# Patient Record
Sex: MALE | Race: BLACK OR AFRICAN AMERICAN | NOT HISPANIC OR LATINO | Employment: UNEMPLOYED | URBAN - NONMETROPOLITAN AREA
[De-identification: names, ages, dates, MRNs, and addresses within clinical notes are randomized per-mention and may not be internally consistent; named-entity substitution may affect disease eponyms.]

---
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
## 2022-02-17 ENCOUNTER — OFFICE VISIT (OUTPATIENT)
Dept: FAMILY MEDICINE CLINIC | Facility: CLINIC | Age: 22
End: 2022-02-17

## 2022-02-17 ENCOUNTER — LAB (OUTPATIENT)
Dept: LAB | Facility: HOSPITAL | Age: 22
End: 2022-02-17

## 2022-02-17 VITALS
OXYGEN SATURATION: 99 % | HEART RATE: 75 BPM | TEMPERATURE: 98 F | WEIGHT: 141.8 LBS | HEIGHT: 72 IN | DIASTOLIC BLOOD PRESSURE: 76 MMHG | BODY MASS INDEX: 19.21 KG/M2 | SYSTOLIC BLOOD PRESSURE: 122 MMHG

## 2022-02-17 DIAGNOSIS — Z11.3 ROUTINE SCREENING FOR STI (SEXUALLY TRANSMITTED INFECTION): ICD-10-CM

## 2022-02-17 DIAGNOSIS — Z11.3 ROUTINE SCREENING FOR STI (SEXUALLY TRANSMITTED INFECTION): Primary | ICD-10-CM

## 2022-02-17 LAB
BACTERIA UR QL AUTO: ABNORMAL /HPF
BILIRUB UR QL STRIP: NEGATIVE
CLARITY UR: CLEAR
COLOR UR: YELLOW
GLUCOSE UR STRIP-MCNC: NEGATIVE MG/DL
HGB UR QL STRIP.AUTO: NEGATIVE
HYALINE CASTS UR QL AUTO: ABNORMAL /LPF
KETONES UR QL STRIP: NEGATIVE
LEUKOCYTE ESTERASE UR QL STRIP.AUTO: ABNORMAL
NITRITE UR QL STRIP: NEGATIVE
PH UR STRIP.AUTO: 6 [PH] (ref 5–8)
PROT UR QL STRIP: NEGATIVE
RBC # UR STRIP: ABNORMAL /HPF
REF LAB TEST METHOD: ABNORMAL
SP GR UR STRIP: 1.02 (ref 1–1.03)
SQUAMOUS #/AREA URNS HPF: ABNORMAL /HPF
UROBILINOGEN UR QL STRIP: ABNORMAL
WBC # UR STRIP: ABNORMAL /HPF

## 2022-02-17 PROCEDURE — 87661 TRICHOMONAS VAGINALIS AMPLIF: CPT

## 2022-02-17 PROCEDURE — 87491 CHLMYD TRACH DNA AMP PROBE: CPT

## 2022-02-17 PROCEDURE — 81001 URINALYSIS AUTO W/SCOPE: CPT

## 2022-02-17 PROCEDURE — 99213 OFFICE O/P EST LOW 20 MIN: CPT | Performed by: STUDENT IN AN ORGANIZED HEALTH CARE EDUCATION/TRAINING PROGRAM

## 2022-02-17 PROCEDURE — 87591 N.GONORRHOEAE DNA AMP PROB: CPT

## 2022-02-17 RX ORDER — DOXYCYCLINE HYCLATE 100 MG/1
100 CAPSULE ORAL 2 TIMES DAILY
Qty: 14 CAPSULE | Refills: 0 | Status: SHIPPED | OUTPATIENT
Start: 2022-02-17 | End: 2022-08-31

## 2022-02-17 RX ORDER — CEFTRIAXONE 500 MG/1
500 INJECTION, POWDER, FOR SOLUTION INTRAMUSCULAR; INTRAVENOUS ONCE
Qty: 1 EACH | Refills: 0 | Status: SHIPPED | OUTPATIENT
Start: 2022-02-17 | End: 2022-02-18 | Stop reason: SDUPTHER

## 2022-02-17 NOTE — PROGRESS NOTES
Family Medicine Residency  Yesenia Powell MD    Subjective:     Moses Kern is a 21 y.o. male who presents for evalution of STI.     About two weeks ago he started having dysuria which has since resolved. A week ago he started having foul smelling green penile discharge. No fever, chills, rash, open sores on penis, arthralgias or myalgias. He is sexually active and has unprotected intercourse with both male and females. States last intercourse was two weeks ago. Denies tobacco/alcohol use. Is not interested in getting a full STI panel and only would like gonorrhea and chlamydia testing.     The following portions of the patient's history were reviewed and updated as appropriate: past family history, past medical history, past social history, past surgical history and problem list.    Past Medical Hx:  Past Medical History:   Diagnosis Date   • Allergic rhinitis    • Asthma    • Astigmatism    • Cough    • Epistaxis    • Exacerbation of asthma    • Myopia    • Otitis media    • Upper respiratory infection        Past Surgical Hx:  No past surgical history on file.    Current Meds:    Current Outpatient Medications:   •  cefTRIAXone (ROCEPHIN) 500 MG injection, Inject 500 mg into the appropriate muscle as directed by prescriber 1 (One) Time for 1 dose. Patient can bring injection to clinic and have nurse inject it., Disp: 1 each, Rfl: 0  •  doxycycline (VIBRAMYCIN) 100 MG capsule, Take 1 capsule by mouth 2 (Two) Times a Day., Disp: 14 capsule, Rfl: 0    Allergies:  No Known Allergies    Family Hx:  No family history on file.     Social History:  Social History     Socioeconomic History   • Marital status: Single   Tobacco Use   • Smoking status: Never Smoker   • Smokeless tobacco: Never Used   • Tobacco comment: Child   Substance and Sexual Activity   • Alcohol use: Not Currently   • Drug use: Not Currently   • Sexual activity: Defer       Review of Systems  Review of Systems   Constitutional: Negative.  Negative  "for fever.   HENT: Negative.    Eyes: Negative.    Respiratory: Negative.    Cardiovascular: Negative.    Gastrointestinal: Negative.    Endocrine: Negative.    Genitourinary: Positive for dysuria and penile discharge. Negative for difficulty urinating, penile pain, penile swelling and scrotal swelling.   Musculoskeletal: Negative.    Skin: Negative.    Allergic/Immunologic: Negative.    Neurological: Negative.  Negative for dizziness and light-headedness.   Hematological: Negative.    Psychiatric/Behavioral: Negative.  Negative for agitation, confusion and suicidal ideas.       Objective:     /76   Pulse 75   Temp 98 °F (36.7 °C)   Ht 182.9 cm (72\")   Wt 64.3 kg (141 lb 12.8 oz)   SpO2 99%   BMI 19.23 kg/m²   Physical Exam  Vitals reviewed.   Constitutional:       Appearance: Normal appearance.   HENT:      Head: Normocephalic and atraumatic.   Cardiovascular:      Rate and Rhythm: Normal rate and regular rhythm.      Pulses: Normal pulses.      Heart sounds: Normal heart sounds.   Pulmonary:      Effort: Pulmonary effort is normal.      Breath sounds: Normal breath sounds.   Musculoskeletal:         General: Normal range of motion.   Skin:     General: Skin is warm and dry.   Neurological:      General: No focal deficit present.      Mental Status: He is alert.   Psychiatric:         Mood and Affect: Mood normal.         Behavior: Behavior normal.          Assessment/Plan:     Diagnoses and all orders for this visit:    1. Routine screening for STI (sexually transmitted infection) (Primary)  -     Chlamydia trachomatis, Neisseria gonorrhoeae, PCR - , Urine, Clean Catch; Future  -     Urinalysis With Microscopic If Indicated (No Culture) - Urine, Clean Catch; Future    Other orders  -     doxycycline (VIBRAMYCIN) 100 MG capsule; Take 1 capsule by mouth 2 (Two) Times a Day.  Dispense: 14 capsule; Refill: 0  -     cefTRIAXone (ROCEPHIN) 500 MG injection; Inject 500 mg into the appropriate muscle as " directed by prescriber 1 (One) Time for 1 dose. Patient can bring injection to clinic and have nurse inject it.  Dispense: 1 each; Refill: 0      Discussed safe sexual practices including use of condoms. Advised patient to inform his partners he is being tested and they should consider getting tested for STI as well. Sent in empiric antibiotics for patient. Will call with results of gonorrhea and chlamydia. Advised patient to not have intercourse until infection resolves.     · Rx changes: Rocephin and Doxycycline   · Patient Education: safe sexual practices   · Compliance at present is estimated to be poor.   · Efforts to improve compliance (if necessary) will be directed at increased exercise.    Depression screening: Up to date; last screen 9/28/2021     Follow-up:     No follow-ups on file.    Preventative:  Health Maintenance   Topic Date Due   • ANNUAL PHYSICAL  Never done   • HPV VACCINES (3 - Male 3-dose series) 12/24/2018   • INFLUENZA VACCINE  08/01/2021   • COVID-19 Vaccine (3 - Booster for Pfizer series) 09/23/2021   • TDAP/TD VACCINES (3 - Td or Tdap) 01/27/2031   • HEPATITIS C SCREENING  Completed   • MENINGOCOCCAL VACCINE  Completed   • Pneumococcal Vaccine 0-64  Aged Out     Male Preventative: Exercises regularly  Recommended: none  Vaccine Counseling: N/A    Weight  -Class: Normal: 18.5-24.9kg/m2  -Patient's Body mass index is 19.23 kg/m². indicating that he is within normal range (BMI 18.5-24.9). No BMI management plan needed..   increase water intake and increase physical activity    Alcohol use:  reports previous alcohol use.  Nicotine status  reports that he has never smoked. He has never used smokeless tobacco.    Goals    None         RISK SCORE: 3        This document has been electronically signed by Yesenia Powell MD on February 17, 2022 14:55 CST

## 2022-02-18 ENCOUNTER — TELEPHONE (OUTPATIENT)
Dept: FAMILY MEDICINE CLINIC | Facility: CLINIC | Age: 22
End: 2022-02-18

## 2022-02-18 LAB
C TRACH RRNA CVX QL NAA+PROBE: NEGATIVE
N GONORRHOEA RRNA SPEC QL NAA+PROBE: POSITIVE

## 2022-02-18 RX ORDER — CEFTRIAXONE 500 MG/1
INJECTION, POWDER, FOR SOLUTION INTRAMUSCULAR; INTRAVENOUS
Qty: 1 EACH | Refills: 0 | Status: SHIPPED | OUTPATIENT
Start: 2022-02-18 | End: 2022-08-31

## 2022-02-18 RX ORDER — CEPHALEXIN 500 MG/1
500 CAPSULE ORAL 2 TIMES DAILY
Qty: 14 CAPSULE | Refills: 0 | Status: SHIPPED | OUTPATIENT
Start: 2022-02-18 | End: 2022-08-31

## 2022-02-18 NOTE — TELEPHONE ENCOUNTER
This patient left a v/mail at 11:09 this morning stating that Dr Powell had called him & asked for a return call.    His # is [phone].    Thank you,  Ebony

## 2022-02-21 NOTE — PROGRESS NOTES
I have seen the patient.  I have reviewed the notes, assessments, and/or procedures performed by dr Powell, I concur with her/his documentation and assessment and plan for Moses Kern.               This document has been electronically signed by Francesco Go MD on February 21, 2022 10:26 CST

## 2022-08-31 PROCEDURE — 87661 TRICHOMONAS VAGINALIS AMPLIF: CPT | Performed by: NURSE PRACTITIONER

## 2022-08-31 PROCEDURE — 87491 CHLMYD TRACH DNA AMP PROBE: CPT | Performed by: NURSE PRACTITIONER

## 2022-08-31 PROCEDURE — 87591 N.GONORRHOEAE DNA AMP PROB: CPT | Performed by: NURSE PRACTITIONER
